# Patient Record
Sex: FEMALE | Race: WHITE | URBAN - METROPOLITAN AREA
[De-identification: names, ages, dates, MRNs, and addresses within clinical notes are randomized per-mention and may not be internally consistent; named-entity substitution may affect disease eponyms.]

---

## 2017-11-19 ENCOUNTER — OUTPATIENT (OUTPATIENT)
Dept: OUTPATIENT SERVICES | Facility: HOSPITAL | Age: 82
LOS: 1 days | End: 2017-11-19

## 2017-11-20 ENCOUNTER — OUTPATIENT (OUTPATIENT)
Dept: OUTPATIENT SERVICES | Facility: HOSPITAL | Age: 82
LOS: 1 days | End: 2017-11-20

## 2017-11-24 ENCOUNTER — OUTPATIENT (OUTPATIENT)
Dept: OUTPATIENT SERVICES | Facility: HOSPITAL | Age: 82
LOS: 1 days | End: 2017-11-24

## 2017-11-27 ENCOUNTER — OUTPATIENT (OUTPATIENT)
Dept: OUTPATIENT SERVICES | Facility: HOSPITAL | Age: 82
LOS: 1 days | End: 2017-11-27

## 2017-12-04 ENCOUNTER — OUTPATIENT (OUTPATIENT)
Dept: OUTPATIENT SERVICES | Facility: HOSPITAL | Age: 82
LOS: 1 days | End: 2017-12-04

## 2018-08-14 PROBLEM — Z00.00 ENCOUNTER FOR PREVENTIVE HEALTH EXAMINATION: Status: ACTIVE | Noted: 2018-08-14

## 2018-08-24 ENCOUNTER — RECORD ABSTRACTING (OUTPATIENT)
Age: 83
End: 2018-08-24

## 2018-08-24 DIAGNOSIS — Z82.49 FAMILY HISTORY OF ISCHEMIC HEART DISEASE AND OTHER DISEASES OF THE CIRCULATORY SYSTEM: ICD-10-CM

## 2018-08-24 DIAGNOSIS — Z78.9 OTHER SPECIFIED HEALTH STATUS: ICD-10-CM

## 2018-08-24 DIAGNOSIS — Z83.3 FAMILY HISTORY OF DIABETES MELLITUS: ICD-10-CM

## 2018-08-24 DIAGNOSIS — Z83.49 FAMILY HISTORY OF OTHER ENDOCRINE, NUTRITIONAL AND METABOLIC DISEASES: ICD-10-CM

## 2018-08-24 DIAGNOSIS — Z63.4 DISAPPEARANCE AND DEATH OF FAMILY MEMBER: ICD-10-CM

## 2018-08-24 DIAGNOSIS — Z78.0 ASYMPTOMATIC MENOPAUSAL STATE: ICD-10-CM

## 2018-08-24 LAB — HBA1C MFR BLD: 8.3

## 2018-08-24 RX ORDER — FUROSEMIDE 40 MG/1
40 TABLET ORAL
Refills: 0 | Status: ACTIVE | COMMUNITY

## 2018-08-24 RX ORDER — DIGOXIN 125 UG/1
125 TABLET ORAL DAILY
Refills: 0 | Status: ACTIVE | COMMUNITY

## 2018-08-24 RX ORDER — DM/P-EPHED/ACETAMINOPH/DOXYLAM
LIQUID (ML) ORAL
Refills: 0 | Status: ACTIVE | COMMUNITY

## 2018-08-24 RX ORDER — MAGNESIUM 200 MG
200 TABLET ORAL
Refills: 0 | Status: ACTIVE | COMMUNITY

## 2018-08-24 RX ORDER — METOPROLOL SUCCINATE 50 MG/1
50 TABLET, EXTENDED RELEASE ORAL DAILY
Refills: 0 | Status: ACTIVE | COMMUNITY

## 2018-08-24 SDOH — SOCIAL STABILITY - SOCIAL INSECURITY: DISSAPEARANCE AND DEATH OF FAMILY MEMBER: Z63.4

## 2018-09-13 ENCOUNTER — APPOINTMENT (OUTPATIENT)
Dept: ENDOCRINOLOGY | Facility: CLINIC | Age: 83
End: 2018-09-13
Payer: MEDICARE

## 2018-09-13 VITALS
DIASTOLIC BLOOD PRESSURE: 86 MMHG | SYSTOLIC BLOOD PRESSURE: 142 MMHG | HEIGHT: 61.5 IN | BODY MASS INDEX: 27.03 KG/M2 | WEIGHT: 145 LBS | HEART RATE: 98 BPM

## 2018-09-13 DIAGNOSIS — Z86.79 PERSONAL HISTORY OF OTHER DISEASES OF THE CIRCULATORY SYSTEM: ICD-10-CM

## 2018-09-13 LAB — GLUCOSE BLDC GLUCOMTR-MCNC: 249

## 2018-09-13 PROCEDURE — 82962 GLUCOSE BLOOD TEST: CPT

## 2018-09-13 PROCEDURE — 99214 OFFICE O/P EST MOD 30 MIN: CPT | Mod: 25

## 2018-11-30 ENCOUNTER — RX RENEWAL (OUTPATIENT)
Age: 83
End: 2018-11-30

## 2019-03-07 ENCOUNTER — APPOINTMENT (OUTPATIENT)
Dept: ENDOCRINOLOGY | Facility: CLINIC | Age: 84
End: 2019-03-07
Payer: MEDICARE

## 2019-03-07 VITALS
SYSTOLIC BLOOD PRESSURE: 132 MMHG | OXYGEN SATURATION: 97 % | WEIGHT: 137 LBS | HEIGHT: 61.5 IN | HEART RATE: 91 BPM | DIASTOLIC BLOOD PRESSURE: 90 MMHG | BODY MASS INDEX: 25.53 KG/M2

## 2019-03-07 LAB — GLUCOSE BLDC GLUCOMTR-MCNC: 137

## 2019-03-07 PROCEDURE — 99214 OFFICE O/P EST MOD 30 MIN: CPT | Mod: 25

## 2019-03-07 PROCEDURE — 82962 GLUCOSE BLOOD TEST: CPT

## 2019-03-07 RX ORDER — BLOOD-GLUCOSE METER
W/DEVICE EACH MISCELLANEOUS
Qty: 1 | Refills: 1 | Status: ACTIVE | COMMUNITY
Start: 2019-03-07 | End: 1900-01-01

## 2019-03-07 NOTE — PHYSICAL EXAM
[Alert] : alert [No Acute Distress] : no acute distress [Well Nourished] : well nourished [Well Developed] : well developed [Normal Sclera/Conjunctiva] : normal sclera/conjunctiva [EOMI] : extra ocular movement intact [No Proptosis] : no proptosis [Thyroid Not Enlarged] : the thyroid was not enlarged [No Thyroid Nodules] : there were no palpable thyroid nodules [No Respiratory Distress] : no respiratory distress [No Accessory Muscle Use] : no accessory muscle use [Clear to Auscultation] : lungs were clear to auscultation bilaterally [Normal S1, S2] : normal S1 and S2 [Regular Rhythm] : with a regular rhythm [Pedal Pulses Normal] : the pedal pulses are present [No Edema] : there was no peripheral edema [Post Cervical Nodes] : posterior cervical nodes [Anterior Cervical Nodes] : anterior cervical nodes [Axillary Nodes] : axillary nodes [Normal] : normal and non tender [No Spinal Tenderness] : no spinal tenderness [Spine Straight] : spine straight [No Stigmata of Cushings Syndrome] : no stigmata of cushings syndrome [Normal Gait] : normal gait [Normal Strength/Tone] : muscle strength and tone were normal [No Rash] : no rash [Normal Reflexes] : deep tendon reflexes were 2+ and symmetric [No Tremors] : no tremors [Oriented x3] : oriented to person, place, and time [Acanthosis Nigricans] : no acanthosis nigricans [de-identified] : 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

## 2019-03-07 NOTE — REVIEW OF SYSTEMS
[Dizziness] : dizziness [Negative] : Heme/Lymph [FreeTextEntry7] : less appetitie IBS has been under controll  [de-identified] : occasionally feels dizzy before and after eating

## 2019-03-19 ENCOUNTER — RX RENEWAL (OUTPATIENT)
Age: 84
End: 2019-03-19

## 2019-08-19 ENCOUNTER — RX RENEWAL (OUTPATIENT)
Age: 84
End: 2019-08-19

## 2019-08-20 ENCOUNTER — APPOINTMENT (OUTPATIENT)
Dept: ENDOCRINOLOGY | Facility: CLINIC | Age: 84
End: 2019-08-20
Payer: MEDICARE

## 2019-08-20 VITALS
SYSTOLIC BLOOD PRESSURE: 144 MMHG | HEART RATE: 85 BPM | DIASTOLIC BLOOD PRESSURE: 88 MMHG | BODY MASS INDEX: 24.98 KG/M2 | WEIGHT: 134 LBS | HEIGHT: 61.5 IN

## 2019-08-20 PROCEDURE — 99214 OFFICE O/P EST MOD 30 MIN: CPT | Mod: 25

## 2019-08-20 PROCEDURE — 82962 GLUCOSE BLOOD TEST: CPT

## 2019-08-21 LAB — GLUCOSE BLDC GLUCOMTR-MCNC: 133

## 2019-08-28 NOTE — ASSESSMENT
[FreeTextEntry1] : T2DM- cont RX \par  pt checking BS only once per day \par Graves disease post op partial thyroidectomy  on right now off methimaxle- for a few month- check TFT  check sono next year \par HTN stable\par high chol cont RX \par Hypokalemia on Kdurr 20 meq 2 bid  also on lasix and metolazoine as needed \par afib on coumadin l

## 2019-08-28 NOTE — PHYSICAL EXAM
[Alert] : alert [No Acute Distress] : no acute distress [Well Developed] : well developed [Well Nourished] : well nourished [Normal Sclera/Conjunctiva] : normal sclera/conjunctiva [EOMI] : extra ocular movement intact [Thyroid Not Enlarged] : the thyroid was not enlarged [No Proptosis] : no proptosis [No Thyroid Nodules] : there were no palpable thyroid nodules [No Respiratory Distress] : no respiratory distress [No Accessory Muscle Use] : no accessory muscle use [Clear to Auscultation] : lungs were clear to auscultation bilaterally [Normal S1, S2] : normal S1 and S2 [Regular Rhythm] : with a regular rhythm [Pedal Pulses Normal] : the pedal pulses are present [No Edema] : there was no peripheral edema [Post Cervical Nodes] : posterior cervical nodes [Anterior Cervical Nodes] : anterior cervical nodes [Normal] : normal and non tender [Axillary Nodes] : axillary nodes [No Spinal Tenderness] : no spinal tenderness [Spine Straight] : spine straight [No Stigmata of Cushings Syndrome] : no stigmata of cushings syndrome [Normal Gait] : normal gait [Normal Strength/Tone] : muscle strength and tone were normal [No Rash] : no rash [Normal Reflexes] : deep tendon reflexes were 2+ and symmetric [No Tremors] : no tremors [Oriented x3] : oriented to person, place, and time [Acanthosis Nigricans] : no acanthosis nigricans [de-identified] : 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

## 2019-09-16 ENCOUNTER — RX RENEWAL (OUTPATIENT)
Age: 84
End: 2019-09-16

## 2019-10-02 ENCOUNTER — RX RENEWAL (OUTPATIENT)
Age: 84
End: 2019-10-02

## 2019-12-20 ENCOUNTER — APPOINTMENT (OUTPATIENT)
Dept: ENDOCRINOLOGY | Facility: CLINIC | Age: 84
End: 2019-12-20
Payer: MEDICARE

## 2019-12-20 VITALS
BODY MASS INDEX: 26.47 KG/M2 | WEIGHT: 142 LBS | SYSTOLIC BLOOD PRESSURE: 130 MMHG | HEART RATE: 69 BPM | HEIGHT: 61.5 IN | DIASTOLIC BLOOD PRESSURE: 70 MMHG | OXYGEN SATURATION: 98 %

## 2019-12-20 LAB — GLUCOSE BLDC GLUCOMTR-MCNC: 235

## 2019-12-20 PROCEDURE — 99214 OFFICE O/P EST MOD 30 MIN: CPT | Mod: 25

## 2019-12-20 PROCEDURE — 82962 GLUCOSE BLOOD TEST: CPT

## 2019-12-22 RX ORDER — POTASSIUM CHLORIDE 750 MG/1
10 TABLET, EXTENDED RELEASE ORAL
Qty: 30 | Refills: 0 | Status: DISCONTINUED | COMMUNITY
Start: 2019-07-03

## 2019-12-22 RX ORDER — CHOLESTYRAMINE 4 G/9G
4 POWDER, FOR SUSPENSION ORAL
Qty: 180 | Refills: 0 | Status: DISCONTINUED | COMMUNITY
Start: 2019-10-29

## 2019-12-22 RX ORDER — DOXYCYCLINE HYCLATE 100 MG/1
100 CAPSULE ORAL
Qty: 180 | Refills: 0 | Status: DISCONTINUED | COMMUNITY
Start: 2019-11-04

## 2019-12-22 RX ORDER — MECLIZINE HYDROCHLORIDE 12.5 MG/1
12.5 TABLET ORAL
Qty: 30 | Refills: 0 | Status: DISCONTINUED | COMMUNITY
Start: 2019-12-01

## 2019-12-22 RX ORDER — CEPHALEXIN 500 MG/1
500 CAPSULE ORAL
Qty: 21 | Refills: 0 | Status: DISCONTINUED | COMMUNITY
Start: 2019-07-03

## 2019-12-22 NOTE — PHYSICAL EXAM
[Alert] : alert [No Acute Distress] : no acute distress [Normal Sclera/Conjunctiva] : normal sclera/conjunctiva [Well Nourished] : well nourished [Well Developed] : well developed [EOMI] : extra ocular movement intact [Thyroid Not Enlarged] : the thyroid was not enlarged [No Thyroid Nodules] : there were no palpable thyroid nodules [No Respiratory Distress] : no respiratory distress [Clear to Auscultation] : lungs were clear to auscultation bilaterally [No Accessory Muscle Use] : no accessory muscle use [Regular Rhythm] : with a regular rhythm [Normal S1, S2] : normal S1 and S2 [Pedal Pulses Normal] : the pedal pulses are present [No Edema] : there was no peripheral edema [Anterior Cervical Nodes] : anterior cervical nodes [Post Cervical Nodes] : posterior cervical nodes [Axillary Nodes] : axillary nodes [No Spinal Tenderness] : no spinal tenderness [No Stigmata of Cushings Syndrome] : no stigmata of cushings syndrome [Spine Straight] : spine straight [No Rash] : no rash [Normal Reflexes] : deep tendon reflexes were 2+ and symmetric [No Tremors] : no tremors [Oriented x3] : oriented to person, place, and time [Acanthosis Nigricans] : no acanthosis nigricans [Right Foot Was Examined] : right foot ~C was examined [Left Foot Was Examined] : left foot ~C was examined [Full ROM] : with full range of motion [Normal] : normal [de-identified] : 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

## 2019-12-22 NOTE — ASSESSMENT
[FreeTextEntry1] : T2DM- cont RX  seems contorlled for her age but room for improvement with diet- pt will try  to do better \par  will check labs and send APex to her house  UTD with eye exam  Dec 10th \par \par  pt checking BS only once per day \par \par Graves disease post op partial thyroidectomy  on right now off methimaxle- for a few month-last set of TFT wnl off MMI  check again now \par HTN stable\par high chol cont RX \par Hypokalemia on Kdurr 20 meq 2 bid  also on lasix and metolazoine as needed \par afib on coumadin l

## 2019-12-22 NOTE — REVIEW OF SYSTEMS
[Dizziness] : dizziness [Negative] : Gastrointestinal [de-identified] : occasionally feels dizzy before and after eating

## 2020-03-09 ENCOUNTER — RX RENEWAL (OUTPATIENT)
Age: 85
End: 2020-03-09

## 2020-04-02 ENCOUNTER — APPOINTMENT (OUTPATIENT)
Dept: ENDOCRINOLOGY | Facility: CLINIC | Age: 85
End: 2020-04-02

## 2020-04-02 DIAGNOSIS — E87.6 HYPOKALEMIA: ICD-10-CM

## 2020-04-13 ENCOUNTER — APPOINTMENT (OUTPATIENT)
Dept: ENDOCRINOLOGY | Facility: CLINIC | Age: 85
End: 2020-04-13
Payer: MEDICARE

## 2020-04-13 PROCEDURE — 99442: CPT

## 2020-04-13 RX ORDER — PANTOPRAZOLE SODIUM 40 MG/1
40 TABLET, DELAYED RELEASE ORAL DAILY
Refills: 0 | Status: DISCONTINUED | COMMUNITY
End: 2020-04-13

## 2020-04-16 ENCOUNTER — RX RENEWAL (OUTPATIENT)
Age: 85
End: 2020-04-16

## 2020-07-15 ENCOUNTER — APPOINTMENT (OUTPATIENT)
Dept: ENDOCRINOLOGY | Facility: CLINIC | Age: 85
End: 2020-07-15
Payer: MEDICARE

## 2020-07-15 VITALS
HEIGHT: 61 IN | BODY MASS INDEX: 23.22 KG/M2 | DIASTOLIC BLOOD PRESSURE: 70 MMHG | WEIGHT: 123 LBS | SYSTOLIC BLOOD PRESSURE: 110 MMHG

## 2020-07-15 VITALS — TEMPERATURE: 97.3 F

## 2020-07-15 PROCEDURE — 99215 OFFICE O/P EST HI 40 MIN: CPT

## 2020-07-15 RX ORDER — BLOOD SUGAR DIAGNOSTIC
STRIP MISCELLANEOUS TWICE DAILY
Refills: 0 | Status: DISCONTINUED | COMMUNITY
End: 2020-07-15

## 2020-07-15 RX ORDER — PANTOPRAZOLE 40 MG/1
40 TABLET, DELAYED RELEASE ORAL
Refills: 0 | Status: ACTIVE | COMMUNITY

## 2020-07-15 RX ORDER — APIXABAN 5 MG/1
5 TABLET, FILM COATED ORAL
Refills: 0 | Status: ACTIVE | COMMUNITY

## 2020-07-15 RX ORDER — DOXYCYCLINE 100 MG/1
100 TABLET, FILM COATED ORAL TWICE DAILY
Qty: 20 | Refills: 0 | Status: ACTIVE | COMMUNITY
Start: 2020-07-15

## 2020-07-15 RX ORDER — MECLIZINE HYDROCHLORIDE 25 MG/1
25 TABLET ORAL
Refills: 0 | Status: ACTIVE | COMMUNITY

## 2020-07-15 RX ORDER — CHOLESTYRAMINE 4 G/5.5G
4 POWDER, FOR SUSPENSION ORAL
Refills: 0 | Status: DISCONTINUED | COMMUNITY
End: 2020-07-15

## 2020-07-15 RX ORDER — AMLODIPINE BESYLATE 5 MG/1
5 TABLET ORAL
Refills: 0 | Status: ACTIVE | COMMUNITY

## 2020-07-15 RX ORDER — WARFARIN SODIUM 2.5 MG/1
2.5 TABLET ORAL DAILY
Refills: 0 | Status: DISCONTINUED | COMMUNITY
End: 2020-07-15

## 2020-07-19 NOTE — REASON FOR VISIT
[Follow - Up] : a follow-up visit [FreeTextEntry1] : t2dm hyperthryoidsm S/P partial thyroidectomy Graves disease

## 2020-07-19 NOTE — ASSESSMENT
[FreeTextEntry1] : T2DM uncontrolled \par  recent CVA\par  started on Olanzapine for  memory  and agitaiton\par  also now  with recurrent cellulitis as has been off doxycycline - lieklya ll ocntributing to hypoerglcymeia\par \par  Inc Lantus to 32 units\par  In Novolog scle tid ac  as  follows  \par   Novolog sliding scale \par  = 0 \par 101-150=4>>6\par 151-200=5>>7\par 201-250-6 >>8\par 251-300-7>>9\par 301-350=8>>10 \par 351-400=9>>12\par >400= 10 >>14\par \par \par left knee cellulitis \par resuem doxycyline tonight\par  tired to call PMD- NA \par   f dw  pt son and phoned her other son- willcall PMD firstr in AM \par  ? may not need to be hospitalized but in past pt did develop abscess likely due to beign off doxycycline for a while -  pt has had  chronic infeciton and was supposed to be on Doxy to suppress infeciton \par  pt checking BS only once per day \par \par Graves disease post op partial thyroidectomy  on right now off methimaxle- for a few month-last set of TFT wnl off MMI  check again now \par HTN stable\par high chol cont RX \par Hypokalemia on Kdurr 20 meq 2 bid  also on lasix and metolazoine as needed \par afib  on Eliquis now

## 2020-07-19 NOTE — PHYSICAL EXAM
[Alert] : alert [Well Nourished] : well nourished [No Acute Distress] : no acute distress [Well Developed] : well developed [EOMI] : extra ocular movement intact [Normal Sclera/Conjunctiva] : normal sclera/conjunctiva [No Proptosis] : no proptosis [Normal Oropharynx] : the oropharynx was normal [Thyroid Not Enlarged] : the thyroid was not enlarged [No Thyroid Nodules] : no palpable thyroid nodules [No Accessory Muscle Use] : no accessory muscle use [No Respiratory Distress] : no respiratory distress [Normal Rate] : heart rate was normal [Normal S1, S2] : normal S1 and S2 [Clear to Auscultation] : lungs were clear to auscultation bilaterally [Regular Rhythm] : with a regular rhythm [Pedal Pulses Normal] : the pedal pulses are present [No Edema] : no peripheral edema [Not Tender] : non-tender [Normal Bowel Sounds] : normal bowel sounds [Soft] : abdomen soft [Not Distended] : not distended [Normal Anterior Cervical Nodes] : no anterior cervical lymphadenopathy [No Spinal Tenderness] : no spinal tenderness [Normal Posterior Cervical Nodes] : no posterior cervical lymphadenopathy [No Stigmata of Cushings Syndrome] : no stigmata of Cushings Syndrome [Spine Straight] : spine straight [Normal Gait] : normal gait [Acanthosis Nigricans] : no acanthosis nigricans [Normal Strength/Tone] : muscle strength and tone were normal [No Tremors] : no tremors [Normal Reflexes] : deep tendon reflexes were 2+ and symmetric [Oriented x3] : oriented to person, place, and time [de-identified] : skin over medial left knee is hot with  mild erythema

## 2020-07-22 ENCOUNTER — APPOINTMENT (OUTPATIENT)
Dept: ENDOCRINOLOGY | Facility: CLINIC | Age: 85
End: 2020-07-22
Payer: MEDICARE

## 2020-07-22 LAB — HBA1C MFR BLD HPLC: 8.7

## 2020-07-22 PROCEDURE — 97803 MED NUTRITION INDIV SUBSEQ: CPT

## 2020-07-22 RX ORDER — INSULIN GLARGINE 100 [IU]/ML
100 INJECTION, SOLUTION SUBCUTANEOUS AT BEDTIME
Refills: 0 | Status: DISCONTINUED | COMMUNITY
End: 2020-07-22

## 2020-07-22 RX ORDER — GLIMEPIRIDE 4 MG/1
4 TABLET ORAL TWICE DAILY
Qty: 180 | Refills: 1 | Status: DISCONTINUED | COMMUNITY
Start: 2019-03-19 | End: 2020-07-22

## 2020-07-22 RX ORDER — INSULIN LISPRO 100 [IU]/ML
100 INJECTION, SOLUTION INTRAVENOUS; SUBCUTANEOUS
Refills: 0 | Status: DISCONTINUED | COMMUNITY
End: 2020-07-22

## 2020-08-20 ENCOUNTER — APPOINTMENT (OUTPATIENT)
Dept: ENDOCRINOLOGY | Facility: CLINIC | Age: 85
End: 2020-08-20
Payer: MEDICARE

## 2020-08-20 VITALS
WEIGHT: 135 LBS | BODY MASS INDEX: 25.49 KG/M2 | HEART RATE: 79 BPM | SYSTOLIC BLOOD PRESSURE: 110 MMHG | DIASTOLIC BLOOD PRESSURE: 74 MMHG | HEIGHT: 61 IN

## 2020-08-20 PROCEDURE — 99214 OFFICE O/P EST MOD 30 MIN: CPT | Mod: 25

## 2020-08-20 PROCEDURE — 82962 GLUCOSE BLOOD TEST: CPT

## 2020-08-20 RX ORDER — PRAVASTATIN SODIUM 20 MG/1
20 TABLET ORAL
Refills: 0 | Status: ACTIVE | COMMUNITY

## 2020-08-20 RX ORDER — OLANZAPINE 10 MG/1
10 TABLET, FILM COATED ORAL
Refills: 0 | Status: DISCONTINUED | COMMUNITY
End: 2020-08-20

## 2020-08-20 RX ORDER — ATORVASTATIN CALCIUM 20 MG/1
20 TABLET, FILM COATED ORAL
Refills: 0 | Status: DISCONTINUED | COMMUNITY
End: 2020-08-20

## 2020-08-21 LAB — GLUCOSE BLDC GLUCOMTR-MCNC: 129

## 2020-08-24 NOTE — PHYSICAL EXAM
[Alert] : alert [Well Nourished] : well nourished [No Acute Distress] : no acute distress [Well Developed] : well developed [Normal Sclera/Conjunctiva] : normal sclera/conjunctiva [EOMI] : extra ocular movement intact [No Proptosis] : no proptosis [Normal Oropharynx] : the oropharynx was normal [No Thyroid Nodules] : no palpable thyroid nodules [No Respiratory Distress] : no respiratory distress [Thyroid Not Enlarged] : the thyroid was not enlarged [No Accessory Muscle Use] : no accessory muscle use [Clear to Auscultation] : lungs were clear to auscultation bilaterally [Regular Rhythm] : with a regular rhythm [Normal S1, S2] : normal S1 and S2 [Normal Rate] : heart rate was normal [Pedal Pulses Normal] : the pedal pulses are present [No Edema] : no peripheral edema [Normal Gait] : normal gait [No Stigmata of Cushings Syndrome] : no stigmata of Cushings Syndrome [Normal Reflexes] : deep tendon reflexes were 2+ and symmetric [Normal Strength/Tone] : muscle strength and tone were normal [No Tremors] : no tremors [Oriented x3] : oriented to person, place, and time [Acanthosis Nigricans] : no acanthosis nigricans [de-identified] : skin over medial left knee iwith  mild erythema

## 2020-08-24 NOTE — ASSESSMENT
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

## 2020-08-31 ENCOUNTER — LABORATORY RESULT (OUTPATIENT)
Age: 85
End: 2020-08-31

## 2020-10-09 ENCOUNTER — RX RENEWAL (OUTPATIENT)
Age: 85
End: 2020-10-09

## 2020-12-03 ENCOUNTER — RESULT CHARGE (OUTPATIENT)
Age: 85
End: 2020-12-03

## 2020-12-03 ENCOUNTER — APPOINTMENT (OUTPATIENT)
Dept: ENDOCRINOLOGY | Facility: CLINIC | Age: 85
End: 2020-12-03
Payer: MEDICARE

## 2020-12-03 VITALS
HEIGHT: 61 IN | BODY MASS INDEX: 24.55 KG/M2 | OXYGEN SATURATION: 99 % | SYSTOLIC BLOOD PRESSURE: 110 MMHG | WEIGHT: 130 LBS | HEART RATE: 66 BPM | DIASTOLIC BLOOD PRESSURE: 70 MMHG

## 2020-12-03 DIAGNOSIS — I10 ESSENTIAL (PRIMARY) HYPERTENSION: ICD-10-CM

## 2020-12-03 DIAGNOSIS — E05.10 THYROTOXICOSIS WITH TOXIC SINGLE THYROID NODULE W/OUT THYROTOXIC CRISIS OR STORM: ICD-10-CM

## 2020-12-03 DIAGNOSIS — E53.8 DEFICIENCY OF OTHER SPECIFIED B GROUP VITAMINS: ICD-10-CM

## 2020-12-03 DIAGNOSIS — E55.9 VITAMIN D DEFICIENCY, UNSPECIFIED: ICD-10-CM

## 2020-12-03 DIAGNOSIS — E78.5 HYPERLIPIDEMIA, UNSPECIFIED: ICD-10-CM

## 2020-12-03 DIAGNOSIS — E11.9 TYPE 2 DIABETES MELLITUS W/OUT COMPLICATIONS: ICD-10-CM

## 2020-12-03 DIAGNOSIS — E05.20 THYROTOXICOSIS WITH TOXIC MULTINODULAR GOITER W/OUT THYROTOXIC CRISIS OR STORM: ICD-10-CM

## 2020-12-03 LAB — GLUCOSE BLDC GLUCOMTR-MCNC: 148

## 2020-12-03 PROCEDURE — 82962 GLUCOSE BLOOD TEST: CPT

## 2020-12-03 PROCEDURE — 99214 OFFICE O/P EST MOD 30 MIN: CPT | Mod: 25

## 2020-12-03 RX ORDER — PEN NEEDLE, DIABETIC 32GX 5/32"
32G X 4 MM NEEDLE, DISPOSABLE MISCELLANEOUS
Qty: 400 | Refills: 1 | Status: ACTIVE | COMMUNITY
Start: 2020-07-22 | End: 1900-01-01

## 2020-12-03 NOTE — REVIEW OF SYSTEMS
[Headaches] : headaches [Fatigue] : no fatigue [Recent Weight Gain (___ Lbs)] : no recent weight gain [Recent Weight Loss (___ Lbs)] : no recent weight loss [Visual Field Defect] : no visual field defect [Blurred Vision] : no blurred vision [Dysphagia] : no dysphagia [Neck Pain] : no neck pain [Dysphonia] : no dysphonia [Chest Pain] : no chest pain [Shortness Of Breath] : no shortness of breath [Nausea] : no nausea [Constipation] : no constipation [Diarrhea] : no diarrhea [Polyuria] : no polyuria [Polydipsia] : no polydipsia [FreeTextEntry4] : c/o of back neck pain

## 2020-12-03 NOTE — PHYSICAL EXAM
[Alert] : alert [Well Nourished] : well nourished [No Acute Distress] : no acute distress [Normal Sclera/Conjunctiva] : normal sclera/conjunctiva [Normal Hearing] : hearing was normal [Well Healed Scar] : well healed scar [No Accessory Muscle Use] : no accessory muscle use [Clear to Auscultation] : lungs were clear to auscultation bilaterally [Normal S1, S2] : normal S1 and S2 [Normal Rate] : heart rate was normal [No Edema] : no peripheral edema [Not Tender] : non-tender [Soft] : abdomen soft [No Rash] : no rash [No Tremors] : no tremors [Oriented x3] : oriented to person, place, and time [de-identified] : in wheelchair

## 2020-12-03 NOTE — ASSESSMENT
[FreeTextEntry1] : T2DM\par -PT doing very well. No blood sugars that do not meet goal. \par -Continue current basal/bolus insulin regimen\par -Continue to check BS and call office if pt begins to have hypoglycemia\par -Continue to watch diet and have treats in moderation\par -Can do chair exercises as tolerated\par -Continue to follow up with all specialists including ophtho, podiatry, and cardiology\par \par Hypothyroid\par -Will check TFTs with labs\par \par HTN\par -BP stable in office, continue regimen \par \par \par HLD\par -Continue statin, need updated fasting lipid panel\par \par Vit B Def\par -Will check levels with labs\par \par Vit D Def\par -Will check levels with labs\par \par Will organize APEX to go to pt home, will call with lab results\par RTO 3-4 months with Dr. Avitia

## 2020-12-03 NOTE — HISTORY OF PRESENT ILLNESS
[FreeTextEntry1] : Here with son\par \par T2DM\par Severity: well controlled\par Duration: approx 50 years\par Modifying Factors: on insulin consistently since July\par Associated Symptoms: s.p CVA\par \par SMBG\par Checks BS \par On average \par BS in office 148\par Denies hypoglycemia\par \par Current drug regimen\par Lantus 34 units QHS\par Novolog scale\par  = 0 \par 101-150=>6\par 151-200=7\par 201-250-8\par 251-300-9\par 301-350=10 \par 351-400=12\par >400= 14\par \par Eye exam: next visit next week- Denies DR\par Foot exam: sees podiatry every 3 months- denies neuropathy pain\par Kidney disease: denies\par Heart disease: follows with cardiology\par \par Weight: stable\par Diet: decreased appetite \par drinks water, diet soda \par enjoys chocolate in moderation \par Exercise: in a wheelchair \par Smoking: denies \par \par Hyperthyroid post op partial thyroidectomy\par Current regimen: On no medication \par Current TFTs: Need updated TFTs\par \par \par HLD\par No updated lipid panel \par Pravastatin 20 mg daily\par \par HTN\par BP in office 110/70\par Amlodipine 5 mg daily\par Metoprolol 50 mg daily\par \par Vit D Def\par No updated levels\par On no supplement\par \par Vit B Def\par No updated levels\par On no supplement\par \par Recently had iron infusions for anemia\par Takes Kchlor BID

## 2021-03-30 ENCOUNTER — RX RENEWAL (OUTPATIENT)
Age: 86
End: 2021-03-30

## 2021-04-02 ENCOUNTER — RX RENEWAL (OUTPATIENT)
Age: 86
End: 2021-04-02

## 2021-04-07 LAB
HBA1C MFR BLD HPLC: 6.8
LDLC SERPL DIRECT ASSAY-MCNC: 39.1
MICROALBUMIN/CREAT 24H UR-RTO: 86.22

## 2021-04-08 ENCOUNTER — APPOINTMENT (OUTPATIENT)
Dept: ENDOCRINOLOGY | Facility: CLINIC | Age: 86
End: 2021-04-08
Payer: MEDICARE

## 2021-04-08 VITALS
HEIGHT: 61 IN | OXYGEN SATURATION: 98 % | WEIGHT: 135 LBS | DIASTOLIC BLOOD PRESSURE: 70 MMHG | BODY MASS INDEX: 25.49 KG/M2 | HEART RATE: 84 BPM | SYSTOLIC BLOOD PRESSURE: 134 MMHG

## 2021-04-08 LAB — GLUCOSE BLDC GLUCOMTR-MCNC: 94

## 2021-04-08 PROCEDURE — 82962 GLUCOSE BLOOD TEST: CPT

## 2021-04-08 PROCEDURE — 99214 OFFICE O/P EST MOD 30 MIN: CPT | Mod: 25

## 2021-04-08 RX ORDER — METOLAZONE 2.5 MG/1
2.5 TABLET ORAL
Refills: 0 | Status: DISCONTINUED | COMMUNITY
End: 2021-04-08

## 2021-04-11 NOTE — REVIEW OF SYSTEMS
Pt has BV and ureaplasma. Will need Flagyl and Zithromax. Could be what is causing her issues.  Thanks ML [Headaches] : headaches [Fatigue] : no fatigue [Recent Weight Gain (___ Lbs)] : no recent weight gain [Recent Weight Loss (___ Lbs)] : no recent weight loss [Visual Field Defect] : no visual field defect [Blurred Vision] : no blurred vision [Dysphagia] : no dysphagia [Neck Pain] : no neck pain [Dysphonia] : no dysphonia [Chest Pain] : no chest pain [Shortness Of Breath] : no shortness of breath [Nausea] : no nausea [Constipation] : no constipation [Diarrhea] : no diarrhea [Polyuria] : no polyuria [Polydipsia] : no polydipsia [FreeTextEntry4] : c/o of back neck pain

## 2021-04-11 NOTE — PHYSICAL EXAM
[Alert] : alert [Well Nourished] : well nourished [No Acute Distress] : no acute distress [Normal Sclera/Conjunctiva] : normal sclera/conjunctiva [Normal Hearing] : hearing was normal [Well Healed Scar] : well healed scar [No Accessory Muscle Use] : no accessory muscle use [Clear to Auscultation] : lungs were clear to auscultation bilaterally [Normal S1, S2] : normal S1 and S2 [Normal Rate] : heart rate was normal [No Edema] : no peripheral edema [Not Tender] : non-tender [Soft] : abdomen soft [No Rash] : no rash [No Tremors] : no tremors [Oriented x3] : oriented to person, place, and time [de-identified] : in wheelchair

## 2021-04-11 NOTE — ASSESSMENT
[FreeTextEntry1] : T2DM\par -PT doing very well. \par willreduce Lnatus to 30 units \par reduce coverage scale \par  = 0 \par 101-150=4\par 151-200=5\par 201-250-=6\par 251-300=7\par 301-350=8 \par 351-400=9\par >400= 10\par -Continue to check BS and call office if pt begins to have hypoglycemia\par -Continue to watch diet and have treats in moderation\par -Can do chair exercises as tolerated\par -Continue to follow up with all specialists including ophtho, podiatry, and cardiology\par \par Hypothyroid\par -Will check TFTs with labs\par \par HTN\par -BP stable in office, continue regimen \par \par \par HLD\par -Continue statin, need updated fasting lipid panel\par \par Vit B Def\par -Will check levels with labs\par \par Vit D Def\par -Will check levels with labs\par \par Will organize APEX to go to pt home, will call with lab results\par

## 2021-04-11 NOTE — HISTORY OF PRESENT ILLNESS
[FreeTextEntry1] : Here with son\par \par T2DM\par Severity: well controlled\par Duration: approx 50 years\par Modifying Factors: on insulin consistently since July\par Associated Symptoms: s.p CVA\par \par SMBG\par Checks BS \par On average \par BS in office 148\par Denies hypoglycemia\par \par Current drug regimen\par Lantus 34 units QHS\par Novolog scale\par  = 0 \par 101-150=>6\par 151-200=7\par 201-250-8\par 251-300-9\par 301-350=10 \par 351-400=12\par >400= 14\par \par Eye exam: next visit next week- Denies DR\par Foot exam: sees podiatry every 3 months- denies neuropathy pain\par Kidney disease: denies\par Heart disease: follows with cardiology\par \par Weight: stable\par Diet: decreased appetite \par drinks water, diet soda \par enjoys chocolate in moderation \par Exercise: in a wheelchair \par Smoking: denies \par \par Hyperthyroid post op partial thyroidectomy\par Current regimen: On no medication \par Current TFTs: Need updated TFTs\par \par \par HLD\par No updated lipid panel \par Pravastatin 20 mg daily\par \par under contrl sees card also fo Afib\par Amlodipine 5 mg daily\par Metoprolol 50 mg daily\par \par Vit D Def\par No updated levels\par On no supplement\par \par Vit B Def\par No updated levels\par On no supplement\par \par on Kdur 2 bid

## 2021-05-11 ENCOUNTER — RX RENEWAL (OUTPATIENT)
Age: 86
End: 2021-05-11

## 2021-07-30 ENCOUNTER — NON-APPOINTMENT (OUTPATIENT)
Age: 86
End: 2021-07-30

## 2021-08-17 ENCOUNTER — APPOINTMENT (OUTPATIENT)
Dept: ENDOCRINOLOGY | Facility: CLINIC | Age: 86
End: 2021-08-17
Payer: MEDICARE

## 2021-08-17 VITALS
HEIGHT: 61 IN | SYSTOLIC BLOOD PRESSURE: 118 MMHG | HEART RATE: 73 BPM | WEIGHT: 130 LBS | OXYGEN SATURATION: 97 % | DIASTOLIC BLOOD PRESSURE: 72 MMHG | BODY MASS INDEX: 24.55 KG/M2

## 2021-08-17 LAB — GLUCOSE BLDC GLUCOMTR-MCNC: 165

## 2021-08-17 PROCEDURE — 82962 GLUCOSE BLOOD TEST: CPT

## 2021-08-17 PROCEDURE — 99214 OFFICE O/P EST MOD 30 MIN: CPT | Mod: 25

## 2021-08-17 RX ORDER — LANCETS 33 GAUGE
EACH MISCELLANEOUS
Qty: 400 | Refills: 1 | Status: ACTIVE | COMMUNITY
Start: 2019-03-07 | End: 1900-01-01

## 2021-08-19 ENCOUNTER — RX RENEWAL (OUTPATIENT)
Age: 86
End: 2021-08-19

## 2021-08-19 RX ORDER — INSULIN GLARGINE 100 [IU]/ML
100 INJECTION, SOLUTION SUBCUTANEOUS DAILY
Qty: 2 | Refills: 1 | Status: ACTIVE | COMMUNITY
Start: 2020-07-22 | End: 1900-01-01

## 2021-08-24 RX ORDER — INSULIN ASPART 100 [IU]/ML
100 INJECTION, SOLUTION INTRAVENOUS; SUBCUTANEOUS
Qty: 3 | Refills: 1 | Status: DISCONTINUED | COMMUNITY
Start: 2020-07-22 | End: 2021-08-24

## 2021-08-24 RX ORDER — INSULIN LISPRO 100 [IU]/ML
100 INJECTION, SOLUTION INTRAVENOUS; SUBCUTANEOUS
Qty: 2 | Refills: 0 | Status: ACTIVE | COMMUNITY
Start: 2021-08-24 | End: 1900-01-01

## 2021-08-31 NOTE — ASSESSMENT
[FreeTextEntry1] : T2DM\par -PT doing very well. \par willreduce Lnatus to 30 units \par reduce coverage scale \par  = 0 \par 101-150=0\par 151-200=3\par 201-250-=4\par 251-300=5\par 301-350=6 \par 351-400=7\par >400= 18\par \par -Continue to check BS and call office if pt begins to have hypoglycemia\par -Continue to watch diet and have treats in moderation\par -Can do chair exercises as tolerated\par -Continue to follow up with all specialists including ophtho, podiatry, and cardiology\par \par Hypothyroid\par -Will check TFTs with labs\par \par HTN\par -BP stable in office, continue regimen \par \par \par HLD\par -Continue statin, need updated fasting lipid panel\par \par Vit B Def\par -Will check levels with labs\par \par Vit D Def\par -Will check levels with labs\par \par Will organize APEX to go to pt home, will call with lab results\par

## 2021-08-31 NOTE — PHYSICAL EXAM
[Alert] : alert [Well Nourished] : well nourished [No Acute Distress] : no acute distress [Normal Sclera/Conjunctiva] : normal sclera/conjunctiva [Normal Hearing] : hearing was normal [Well Healed Scar] : well healed scar [No Accessory Muscle Use] : no accessory muscle use [Clear to Auscultation] : lungs were clear to auscultation bilaterally [Normal S1, S2] : normal S1 and S2 [Normal Rate] : heart rate was normal [No Edema] : no peripheral edema [Not Tender] : non-tender [Soft] : abdomen soft [No Rash] : no rash [No Tremors] : no tremors [Oriented x3] : oriented to person, place, and time [de-identified] : in wheelchair

## 2021-08-31 NOTE — HISTORY OF PRESENT ILLNESS
[FreeTextEntry1] : Here with son\par \par T2DM\par Severity: well controlled\par Duration: approx 50 years\par Modifying Factors: on insulin consistently since July\par Associated Symptoms: s.p CVA\par \par SMBG\par Checks BS \par On average 133-159  no recentlow BS since Lantus reduced to 15 \par BS in office 165\par Denies hypoglycemia\par \par Current drug regimen\par Lantus 15units QHS\par Novolog scale\par  = 0 \par 101-150=4\par 151-200=5\par 201-250-=6\par 251-300=7\par 301-350=8 \par 351-400=9\par >400= 10\par \par Eye exam: next visit next week- Denies DR\par Foot exam: sees podiatry every 3 months- denies neuropathy pain\par Kidney disease: denies\par Heart disease: follows with cardiology\par \par Weight: stable\par Diet: decreased appetite \par drinks water, diet soda \par enjoys chocolate in moderation \par Exercise: in a wheelchair \par Smoking: denies \par \par Hyperthyroid post op partial thyroidectomy\par Current regimen: On no medication \par Current TFTs: Need updated TFTs\par \par \par HLD\par No updated lipid panel \par Pravastatin 20 mg daily\par \par under contrl sees card also fo Afib\par Amlodipine 5 mg daily\par Metoprolol 50 mg daily\par \par Vit D Def\par No updated levels\par On no supplement\par \par Vit B Def\par No updated levels\par On no supplement\par \par on Kdur 2 bid

## 2021-09-07 ENCOUNTER — NON-APPOINTMENT (OUTPATIENT)
Age: 86
End: 2021-09-07

## 2021-09-26 ENCOUNTER — RX RENEWAL (OUTPATIENT)
Age: 86
End: 2021-09-26

## 2021-11-12 ENCOUNTER — RX RENEWAL (OUTPATIENT)
Age: 86
End: 2021-11-12

## 2021-12-16 ENCOUNTER — APPOINTMENT (OUTPATIENT)
Dept: ENDOCRINOLOGY | Facility: CLINIC | Age: 86
End: 2021-12-16
Payer: MEDICARE

## 2021-12-16 VITALS
WEIGHT: 130 LBS | BODY MASS INDEX: 24.55 KG/M2 | DIASTOLIC BLOOD PRESSURE: 82 MMHG | SYSTOLIC BLOOD PRESSURE: 132 MMHG | OXYGEN SATURATION: 97 % | HEART RATE: 69 BPM | HEIGHT: 61 IN

## 2021-12-16 LAB — GLUCOSE BLDC GLUCOMTR-MCNC: 175

## 2021-12-16 PROCEDURE — 82962 GLUCOSE BLOOD TEST: CPT

## 2021-12-16 PROCEDURE — 99214 OFFICE O/P EST MOD 30 MIN: CPT | Mod: 25

## 2022-01-02 NOTE — HISTORY OF PRESENT ILLNESS
[FreeTextEntry1] : Here with son\par \par T2DM\par Severity: well controlled\par Duration: approx 50 years\par Modifying Factors: on insulin consistently since July\par Associated Symptoms: s.p CVA\par \par SMBG\par Checks BS \par On average 133-159  no recentlow BS since Lantus reduced to 15 \par \par Denies hypoglycemia\par \par Current drug regimen\par Lantus 15units QHS\par Novolog scale\par  = 0 \par 101-150=4\par 151-200=5\par 201-250-=6\par 251-300=7\par 301-350=8 \par 351-400=9\par >400= 10\par \par Eye exam: next visit next week- Denies DR\par Foot exam: sees podiatry every 3 months- denies neuropathy pain\par Kidney disease: denies\par Heart disease: follows with cardiology\par \par Weight: stable\par Diet: decreased appetite \par drinks water, diet soda \par enjoys chocolate in moderation \par Exercise: in a wheelchair \par Smoking: denies \par \par Hyperthyroid post op partial thyroidectomy\par Current regimen: On no medication \par Current TFTs: Need updated TFTs\par \par \par HLD\par No updated lipid panel \par Pravastatin 20 mg daily\par \par under contrl sees card also fo Afib\par Amlodipine 5 mg daily\par Metoprolol 50 mg daily\par \par Vit D Def\par No updated levels\par On no supplement\par \par Vit B Def\par No updated levels\par On no supplement\par \par on Kdur 2 bid \par \par \par had fallen several times over the past several months \par Nov 14- ffell hit head  went to ER\par  no fx no bleed \par \par having some trouble with left knee  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

## 2022-01-02 NOTE — ASSESSMENT
[FreeTextEntry1] : T2DM\par -PT doing very well. \par willreduce Lantus to 13 units \par reduce coverage scale \par  = 0 \par 101-150=0\par 151-200=3\par 201-250-=4\par 251-300=5\par 301-350=6 \par 351-400=7\par >400= 18\par \par -Continue to check BS and call office if pt begins to have hypoglycemia\par -Continue to watch diet and have treats in moderation\par -Can do chair exercises as tolerated\par -Continue to follow up with all specialists including ophtho, podiatry, and cardiology\par \par Hypothyroid\par -Will check TFTs with labs\par \par HTN\par -BP stable in office, continue regimen \par \par \par HLD\par -Continue statin, need updated fasting lipid panel\par \par Vit B Def\par -Will check levels with labs\par \par Vit D Def\par -Will check levels with labs\par Knee - will followup with Ortho for knee heamtoma \par

## 2022-01-02 NOTE — PHYSICAL EXAM
[Alert] : alert [Well Nourished] : well nourished [No Acute Distress] : no acute distress [Normal Sclera/Conjunctiva] : normal sclera/conjunctiva [Normal Hearing] : hearing was normal [Well Healed Scar] : well healed scar [No Accessory Muscle Use] : no accessory muscle use [Clear to Auscultation] : lungs were clear to auscultation bilaterally [Normal S1, S2] : normal S1 and S2 [Normal Rate] : heart rate was normal [No Edema] : no peripheral edema [Not Tender] : non-tender [Soft] : abdomen soft [No Rash] : no rash [No Tremors] : no tremors [Oriented x3] : oriented to person, place, and time [de-identified] : in wheelchair

## 2022-03-14 ENCOUNTER — RX RENEWAL (OUTPATIENT)
Age: 87
End: 2022-03-14

## 2022-03-14 RX ORDER — PEN NEEDLE, DIABETIC 29 G X1/2"
32G X 4 MM NEEDLE, DISPOSABLE MISCELLANEOUS
Qty: 400 | Refills: 1 | Status: ACTIVE | COMMUNITY
Start: 2022-03-14 | End: 1900-01-01

## 2022-03-15 RX ORDER — BLOOD SUGAR DIAGNOSTIC
STRIP MISCELLANEOUS
Qty: 300 | Refills: 1 | Status: ACTIVE | COMMUNITY
Start: 2019-08-19 | End: 1900-01-01

## 2022-03-21 ENCOUNTER — NON-APPOINTMENT (OUTPATIENT)
Age: 87
End: 2022-03-21

## 2022-03-25 ENCOUNTER — RX RENEWAL (OUTPATIENT)
Age: 87
End: 2022-03-25

## 2022-04-13 LAB
HBA1C MFR BLD HPLC: 6.3
LDLC SERPL DIRECT ASSAY-MCNC: 18

## 2022-04-14 ENCOUNTER — RESULT CHARGE (OUTPATIENT)
Age: 87
End: 2022-04-14

## 2022-04-14 ENCOUNTER — APPOINTMENT (OUTPATIENT)
Dept: ENDOCRINOLOGY | Facility: CLINIC | Age: 87
End: 2022-04-14
Payer: MEDICARE

## 2022-04-14 VITALS
DIASTOLIC BLOOD PRESSURE: 72 MMHG | SYSTOLIC BLOOD PRESSURE: 116 MMHG | HEART RATE: 75 BPM | HEIGHT: 61 IN | WEIGHT: 130 LBS | BODY MASS INDEX: 24.55 KG/M2

## 2022-04-14 PROCEDURE — 99214 OFFICE O/P EST MOD 30 MIN: CPT | Mod: 25

## 2022-04-14 PROCEDURE — 82962 GLUCOSE BLOOD TEST: CPT

## 2022-04-15 LAB — GLUCOSE BLDC GLUCOMTR-MCNC: 89

## 2022-05-01 NOTE — PHYSICAL EXAM
[Alert] : alert [Well Nourished] : well nourished [No Acute Distress] : no acute distress [Normal Sclera/Conjunctiva] : normal sclera/conjunctiva [Normal Hearing] : hearing was normal [Well Healed Scar] : well healed scar [No Accessory Muscle Use] : no accessory muscle use [Clear to Auscultation] : lungs were clear to auscultation bilaterally [Normal S1, S2] : normal S1 and S2 [Normal Rate] : heart rate was normal [No Edema] : no peripheral edema [Not Tender] : non-tender [Soft] : abdomen soft [No Rash] : no rash [No Tremors] : no tremors [Oriented x3] : oriented to person, place, and time [de-identified] : in wheelchair  left knee with  healing scar

## 2022-05-01 NOTE — HISTORY OF PRESENT ILLNESS
[FreeTextEntry1] : Here with son\par Pt   had to have surgery on her knee-  had  severe infeciton \par  now doing better \par healed up better \par T2DM\par Severity: well controlled\par Duration: approx 50 years\par Modifying Factors: on insulin consistently since July\par Associated Symptoms: s.p CVA\par \par SMBG\par Checks BS \par On average 133-159  no recentlow BS since Lantus reduced to 15 \par \par Denies hypoglycemia\par \par Current drug regimen\par Lantus 15units QHS\par Novolog scale\par  = 0 \par 101-150=4\par 151-200=5\par 201-250-=6\par 251-300=7\par 301-350=8 \par 351-400=9\par >400= 10\par \par Eye exam: next visit next week- Denies DR\par Foot exam: sees podiatry every 3 months- denies neuropathy pain\par Kidney disease: denies\par Heart disease: follows with cardiology\par \par Weight: stable\par Diet: decreased appetite \par drinks water, diet soda \par enjoys chocolate in moderation \par Exercise: in a wheelchair \par Smoking: denies \par \par Hyperthyroid post op partial thyroidectomy\par Current regimen: On no medication \par Current TFTs: Need updated TFTs\par \par \par HLD\par No updated lipid panel \par Pravastatin 20 mg daily\par \par under contrl sees card also fo Afib\par Amlodipine 5 mg daily\par Metoprolol 50 mg daily\par \par Vit D Def\par No updated levels\par On no supplement\par \par Vit B Def\par No updated levels\par On no supplement\par \par on Kdur 2 bid \par \par \par had fallen several times over the past several months \par Nov 14- ffell hit head  went to ER\par  no fx no bleed \par \par having some trouble with left knee  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

## 2022-05-01 NOTE — ASSESSMENT
[FreeTextEntry1] : T2DM\par -PT doing very well. \par willreduce Lantus to 10 units \par cont  coverage scale \par  = 0 \par 101-150=0\par 151-200=3\par 201-250-=4\par 251-300=5\par 301-350=6 \par 351-400=7\par >400= 18\par \par -Continue to check BS and call office if pt begins to have hypoglycemia\par -Continue to watch diet and have treats in moderation\par -Can do chair exercises as tolerated\par -Continue to follow up with all specialists including ophtho, podiatry, and cardiology\par \par Hypothyroid\par -Will check TFTs with labs\par \par HTN\par -BP stable in office, continue regimen \par \par \par HLD\par -Continue statin, need updated fasting lipid panel\par \par Vit B Def\par -Will check levels with labs\par \par Vit D Def\par -Will check levels with labs\par Knee - will followup with Ortho post op infection \par

## 2022-08-12 LAB
HBA1C MFR BLD HPLC: 6.1
LDLC SERPL DIRECT ASSAY-MCNC: 37

## 2022-08-15 ENCOUNTER — APPOINTMENT (OUTPATIENT)
Dept: ENDOCRINOLOGY | Facility: CLINIC | Age: 87
End: 2022-08-15

## 2022-08-15 PROCEDURE — 99214 OFFICE O/P EST MOD 30 MIN: CPT | Mod: 95

## 2022-08-28 NOTE — HISTORY OF PRESENT ILLNESS
[Home] : at home, [unfilled] , at the time of the visit. [Medical Office: ([facility])___] : at the medical office located in  [Verbal consent obtained from patient] : the patient, [unfilled] [FreeTextEntry1] : start time 245 pm \par end time 305 PM \par pt was hospitalized again \par  now at home  with home health aides as is difficult to get around \par \par T2DM\par Severity: well controlled\par Duration: approx 50 years\par Modifying Factors: on insulin consistently since July\par Associated Symptoms: s.p CVA\par \par SMBG\par Checks BS \par On average  150-180\par occ 200 but not too many  per pt son report \par no low BS \par \par Denies hypoglycemia\par \par Current drug regimen\par Lantus 10units QHS\par Novolog scale\par  = 0 \par 101-150=4\par 151-200=5\par 201-250-=6\par 251-300=7\par 301-350=8 \par 351-400=9\par >400= 10\par \par Eye exam: next visit next week- Denies DR\par Foot exam: sees podiatry every 3 months- denies neuropathy pain\par Kidney disease: denies\par Heart disease: follows with cardiology\par \par Weight: stable\par Diet: decreased appetite \par drinks water, diet soda \par enjoys chocolate in moderation \par Exercise: in a wheelchair \par Smoking: denies \par \par Hyperthyroid post op partial thyroidectomy\par Current regimen: On no medication \par Current TFTs: Need updated TFTs\par \par \par HLD\par No updated lipid panel \par Pravastatin 20 mg daily\par \par under contrl sees card also fo Afib\par Amlodipine 5 mg daily\par Metoprolol 50 mg daily\par \par Vit D Def\par No updated levels\par On no supplement\par \par Vit B Def\par No updated levels\par On no supplement\par \par on Kdur 2 bid \par \par \par \par

## 2022-08-28 NOTE — REVIEW OF SYSTEMS
[Fatigue] : no fatigue [Recent Weight Gain (___ Lbs)] : no recent weight gain [Recent Weight Loss (___ Lbs)] : no recent weight loss [Visual Field Defect] : no visual field defect [Blurred Vision] : no blurred vision [Dysphagia] : no dysphagia [Neck Pain] : no neck pain [Dysphonia] : no dysphonia [Chest Pain] : no chest pain [Shortness Of Breath] : no shortness of breath [Nausea] : no nausea [Constipation] : no constipation [Diarrhea] : no diarrhea [Polyuria] : no polyuria [Headaches] : headaches [Polydipsia] : no polydipsia [FreeTextEntry4] : c/o of back neck pain

## 2022-08-28 NOTE — ASSESSMENT
[FreeTextEntry1] : T2DM\par -PT doing very well. \par Cont  Lantus  10 units \par cont  coverage scale \par  = 0 \par 101-150=0\par 151-200=3\par 201-250-=4\par 251-300=5\par 301-350=6 \par 351-400=7\par >400= 18\par \par -Continue to check BS and call office if pt begins to have hypoglycemia\par -Continue to watch diet and have treats in moderation\par -Can do chair exercises as tolerated\par -Continue to follow up with all specialists including ophtho, podiatry, and cardiology\par \par Hypothyroid\par -Will check TFTs with labs\par \par HTN\par -BP stable in office, continue regimen \par \par inc ALk phos-- will follow up with PMD \par -Continue statin, need updated fasting lipid panel\par \par Vit B Def\par -Will check levels with labs\par \par Vit D Def\par -Will check levels with labs\par \par can make TEB for  next visit as well \par pt gets labs with [facility] 1-2 weeks prior to appt \par

## 2022-09-20 ENCOUNTER — RX RENEWAL (OUTPATIENT)
Age: 87
End: 2022-09-20

## 2022-09-20 RX ORDER — POTASSIUM CHLORIDE 1500 MG/1
20 TABLET, EXTENDED RELEASE ORAL TWICE DAILY
Qty: 360 | Refills: 1 | Status: ACTIVE | COMMUNITY
Start: 2018-11-30 | End: 1900-01-01

## 2023-02-13 ENCOUNTER — APPOINTMENT (OUTPATIENT)
Dept: ENDOCRINOLOGY | Facility: CLINIC | Age: 88
End: 2023-02-13